# Patient Record
Sex: MALE | Race: WHITE
[De-identification: names, ages, dates, MRNs, and addresses within clinical notes are randomized per-mention and may not be internally consistent; named-entity substitution may affect disease eponyms.]

---

## 2019-03-21 ENCOUNTER — HOSPITAL ENCOUNTER (EMERGENCY)
Dept: HOSPITAL 53 - M ED | Age: 69
LOS: 1 days | Discharge: HOME | End: 2019-03-22
Payer: MEDICARE

## 2019-03-21 VITALS — BODY MASS INDEX: 35.15 KG/M2 | HEIGHT: 70 IN | WEIGHT: 245.51 LBS

## 2019-03-21 DIAGNOSIS — Z79.01: ICD-10-CM

## 2019-03-21 DIAGNOSIS — M25.561: ICD-10-CM

## 2019-03-21 DIAGNOSIS — G89.18: Primary | ICD-10-CM

## 2019-03-21 DIAGNOSIS — I10: ICD-10-CM

## 2019-03-21 DIAGNOSIS — Z79.899: ICD-10-CM

## 2019-03-22 VITALS — SYSTOLIC BLOOD PRESSURE: 124 MMHG | DIASTOLIC BLOOD PRESSURE: 57 MMHG

## 2019-03-22 LAB
ALBUMIN SERPL BCG-MCNC: 2.6 GM/DL (ref 3.2–5.2)
ALT SERPL W P-5'-P-CCNC: 21 U/L (ref 12–78)
AMYLASE SERPL-CCNC: 38 U/L (ref 25–115)
APTT BLD: 28.9 SECONDS (ref 25.4–37.6)
BASOPHILS # BLD AUTO: 0.1 10^3/UL (ref 0–0.2)
BASOPHILS NFR BLD AUTO: 0.7 % (ref 0–1)
BILIRUB CONJ SERPL-MCNC: 0.2 MG/DL (ref 0–0.2)
BILIRUB SERPL-MCNC: 0.6 MG/DL (ref 0.2–1)
BUN SERPL-MCNC: 17 MG/DL (ref 7–18)
CALCIUM SERPL-MCNC: 8 MG/DL (ref 8.8–10.2)
CHLORIDE SERPL-SCNC: 105 MEQ/L (ref 98–107)
CK MB CFR.DF SERPL CALC: 0.57
CK MB SERPL-MCNC: < 1 NG/ML (ref ?–3.6)
CK SERPL-CCNC: 174 U/L (ref 39–308)
CO2 SERPL-SCNC: 28 MEQ/L (ref 21–32)
CREAT SERPL-MCNC: 0.74 MG/DL (ref 0.7–1.3)
CRP SERPL-MCNC: 21.3 MG/DL (ref 0–0.3)
EOSINOPHIL # BLD AUTO: 0.1 10^3/UL (ref 0–0.5)
EOSINOPHIL NFR BLD AUTO: 1.7 % (ref 0–3)
GFR SERPL CREATININE-BSD FRML MDRD: > 60 ML/MIN/{1.73_M2} (ref 42–?)
GLUCOSE SERPL-MCNC: 119 MG/DL (ref 70–100)
HCT VFR BLD AUTO: 35.1 % (ref 42–52)
HGB BLD-MCNC: 11.4 G/DL (ref 13.5–17.5)
INR PPP: 1.22
LYMPHOCYTES # BLD AUTO: 1.3 10^3/UL (ref 1.5–4.5)
LYMPHOCYTES NFR BLD AUTO: 15.6 % (ref 24–44)
MCH RBC QN AUTO: 27.9 PG (ref 27–33)
MCHC RBC AUTO-ENTMCNC: 32.5 G/DL (ref 32–36.5)
MCV RBC AUTO: 85.8 FL (ref 80–96)
MONOCYTES # BLD AUTO: 0.9 10^3/UL (ref 0–0.8)
MONOCYTES NFR BLD AUTO: 10.8 % (ref 0–5)
NEUTROPHILS # BLD AUTO: 5.8 10^3/UL (ref 1.8–7.7)
NEUTROPHILS NFR BLD AUTO: 68.7 % (ref 36–66)
PLATELET # BLD AUTO: 279 10^3/UL (ref 150–450)
POTASSIUM SERPL-SCNC: 4.1 MEQ/L (ref 3.5–5.1)
PROT SERPL-MCNC: 5.7 GM/DL (ref 6.4–8.2)
PROTHROMBIN TIME: 15.6 SECONDS (ref 12.1–14.4)
RBC # BLD AUTO: 4.09 10^6/UL (ref 4.3–6.1)
SODIUM SERPL-SCNC: 139 MEQ/L (ref 136–145)
TROPONIN I SERPL-MCNC: < 0.02 NG/ML (ref ?–0.1)
WBC # BLD AUTO: 8.4 10^3/UL (ref 4–10)

## 2019-03-22 NOTE — ECGEPIP
Stationary ECG Study

                           [facility] - ED

                                       

                                       Test Date:    2019

Pat Name:     ASHLEY BROWN             Department:   

Patient ID:   [id number]                 Room:         -

Gender:       M                        Technician:   SONIA

:          1950               Requested By: DICK FINN 

Order Number: [id number]     Reading MD:   Nolan Shepard

                                 Measurements

Intervals                              Axis          

Rate:         91                       P:            7

AR:           171                      QRS:          -12

QRSD:         96                       T:            30

QT:           338                                    

QTc:          416                                    

                           Interpretive Statements

SINUS RHYTHM

NSTTW ABNORMALITIES

SIMILAR TO 8/6/15

 

Electronically Signed On 3- 6:43:50 EDT by Nolan Shepard

## 2019-03-31 ENCOUNTER — HOSPITAL ENCOUNTER (OUTPATIENT)
Dept: HOSPITAL 53 - M ED | Age: 69
Discharge: NURSING, REHAB OR LONG-TERM CARE | End: 2019-03-31
Attending: SPECIALIST
Payer: MEDICARE

## 2019-03-31 VITALS — WEIGHT: 247.14 LBS | HEIGHT: 70 IN | BODY MASS INDEX: 35.38 KG/M2

## 2019-03-31 VITALS — DIASTOLIC BLOOD PRESSURE: 66 MMHG | SYSTOLIC BLOOD PRESSURE: 123 MMHG

## 2019-03-31 VITALS — SYSTOLIC BLOOD PRESSURE: 129 MMHG | DIASTOLIC BLOOD PRESSURE: 70 MMHG

## 2019-03-31 VITALS — SYSTOLIC BLOOD PRESSURE: 131 MMHG | DIASTOLIC BLOOD PRESSURE: 68 MMHG

## 2019-03-31 VITALS — DIASTOLIC BLOOD PRESSURE: 68 MMHG | SYSTOLIC BLOOD PRESSURE: 124 MMHG

## 2019-03-31 VITALS — DIASTOLIC BLOOD PRESSURE: 68 MMHG | SYSTOLIC BLOOD PRESSURE: 131 MMHG

## 2019-03-31 DIAGNOSIS — Z79.01: ICD-10-CM

## 2019-03-31 DIAGNOSIS — R33.9: ICD-10-CM

## 2019-03-31 DIAGNOSIS — Z86.61: ICD-10-CM

## 2019-03-31 DIAGNOSIS — Z87.820: ICD-10-CM
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
## 2019-03-31 DIAGNOSIS — Z96.651: ICD-10-CM

## 2019-03-31 DIAGNOSIS — Z86.718: ICD-10-CM

## 2019-03-31 DIAGNOSIS — N36.5: Primary | ICD-10-CM
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
## 2019-03-31 DIAGNOSIS — N32.89: ICD-10-CM

## 2019-03-31 DIAGNOSIS — T83.83XA: ICD-10-CM
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
## 2019-03-31 DIAGNOSIS — Z86.711: ICD-10-CM

## 2019-03-31 DIAGNOSIS — R31.0: ICD-10-CM

## 2019-03-31 LAB
APTT BLD: 33.6 SECONDS (ref 25.4–37.6)
BUN SERPL-MCNC: 19 MG/DL (ref 7–18)
CALCIUM SERPL-MCNC: 8.2 MG/DL (ref 8.8–10.2)
CHLORIDE SERPL-SCNC: 101 MEQ/L (ref 98–107)
CO2 SERPL-SCNC: 30 MEQ/L (ref 21–32)
CREAT SERPL-MCNC: 0.96 MG/DL (ref 0.7–1.3)
GFR SERPL CREATININE-BSD FRML MDRD: > 60 ML/MIN/{1.73_M2} (ref 42–?)
GLUCOSE SERPL-MCNC: 122 MG/DL (ref 70–100)
HCT VFR BLD AUTO: 37.1 % (ref 42–52)
HGB BLD-MCNC: 12 G/DL (ref 13.5–17.5)
INR PPP: 1.48
MCH RBC QN AUTO: 27.9 PG (ref 27–33)
MCHC RBC AUTO-ENTMCNC: 32.3 G/DL (ref 32–36.5)
MCV RBC AUTO: 86.3 FL (ref 80–96)
PLATELET # BLD AUTO: 436 10^3/UL (ref 150–450)
POTASSIUM SERPL-SCNC: 3.9 MEQ/L (ref 3.5–5.1)
PROTHROMBIN TIME: 18.2 SECONDS (ref 12.1–14.4)
RBC # BLD AUTO: 4.3 10^6/UL (ref 4.3–6.1)
SODIUM SERPL-SCNC: 138 MEQ/L (ref 136–145)
WBC # BLD AUTO: 13.5 10^3/UL (ref 4–10)

## 2019-03-31 PROCEDURE — 86900 BLOOD TYPING SEROLOGIC ABO: CPT

## 2019-03-31 PROCEDURE — 85027 COMPLETE CBC AUTOMATED: CPT

## 2019-03-31 PROCEDURE — 86901 BLOOD TYPING SEROLOGIC RH(D): CPT

## 2019-03-31 PROCEDURE — 80048 BASIC METABOLIC PNL TOTAL CA: CPT

## 2019-03-31 PROCEDURE — 85730 THROMBOPLASTIN TIME PARTIAL: CPT

## 2019-03-31 PROCEDURE — 86850 RBC ANTIBODY SCREEN: CPT

## 2019-03-31 PROCEDURE — 85610 PROTHROMBIN TIME: CPT

## 2019-03-31 PROCEDURE — 99285 EMERGENCY DEPT VISIT HI MDM: CPT

## 2019-03-31 PROCEDURE — 52001 CYSTO W/IRRG&EVAC MLT CLOTS: CPT
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
## 2019-04-01 ENCOUNTER — HOSPITAL ENCOUNTER (OUTPATIENT)
Dept: HOSPITAL 53 - SKLAB4 | Age: 69
End: 2019-04-01
Attending: INTERNAL MEDICINE

## 2019-04-01 DIAGNOSIS — D64.9: Primary | ICD-10-CM

## 2019-04-01 LAB
HCT VFR BLD AUTO: 32.4 % (ref 42–52)
HGB BLD-MCNC: 10.2 G/DL (ref 13.5–17.5)
MCH RBC QN AUTO: 27.8 PG (ref 27–33)
MCHC RBC AUTO-ENTMCNC: 31.5 G/DL (ref 32–36.5)
MCV RBC AUTO: 88.3 FL (ref 80–96)
PLATELET # BLD AUTO: 381 10^3/UL (ref 150–450)
RBC # BLD AUTO: 3.67 10^6/UL (ref 4.3–6.1)
WBC # BLD AUTO: 9.6 10^3/UL (ref 4–10)

## 2019-04-02 NOTE — RO
DATE OF SURGICAL PROCEDURE:  03/31/2019

 

PREOPERATIVE DIAGNOSES:  Inability to place Hugo catheter, gross hematuria, and

urinary retention.

 

POSTOPERATIVE DIAGNOSES:  Multiple urethral false passages and an elevated

bladder neck.

 

PROCEDURE:  Cystoscopy, complex Hugo catheter placement, and clot irrigation.

 

SURGEON:  Myrtle Gasca MD

 

ANESTHESIA:  Monitored anesthesia care (MAC).

 

MEDICATIONS:  Ancef 2 grams preoperatively.

 

DRAINS:  Three-way 20-Armenian Hugo catheter.

 

INDICATIONS FOR PROCEDURE:  The patient is a 68-year-old gentleman who recently

had a right total knee replacement.  [facility]

to [facility].  A postvoid residual (PVR) was 900 mL, and a Hugo catheter was

placed.  Originally, this was draining well, but then it stopped draining, so it

was removed.  Nurses attempted for "several hours" to put in another catheter but

were unable to.  The patient was then sent to the emergency room where the nurses

there tried, and I tried also but also was unable to place a Hugo.  At this

point, it was decided to bring the patient up to the operating room for further

management.  All options, alternatives, risks, and benefits were discussed, and

informed consent was obtained in both verbal and written form.

 

DESCRIPTION OF PROCEDURE:  The patient was brought into the operating room.

Sequential compression devices were in place, and antibiotics were given.

Anesthesia was given with mild sedation, and we placed his right knee extremely

carefully in the lithotomy position while the patient was still partially awake

to tell us if there was any discomfort.  Once he was in the lithotomy position

and his pressure points were well padded and protected, he was prepped and draped

in a usual fashion.  Next, a 21-Armenian cystoscope was inserted.  The urethra was

opened until we got to the area where there were at least 4-5 false passages at

the level of the verumontanum.  Upon entering the verumontanum, the prostatic

urethra was only about 1-1/2 to 2 cm in length, but there was lateral hypertrophy

and an elevated bladder neck.  Upon entering the bladder, I emptied the bladder,

and there were a few clots which were then irrigated.  At this point, cystoscopy

showed both ureteral orifices with some trabeculation but no stones, erythematous

patches, or lesions.  At this point, a wire was placed in the bladder, and a

20-Armenian three-way Hugo catheter was cut at the tip and placed over the wire

without difficulty into the bladder.  20 mL were placed in the balloon.  It was

then draining clear yellow urine.  The patient tolerated the procedure well and

was returned to the recovery room in stable condition.

 

I recommend keeping the Hugo in for a minimum of 7 days to allow the urethral

false passages to heal and that the patient not have a voiding trial until he is

ambulating better and moving his bowels well.

## 2019-04-12 ENCOUNTER — HOSPITAL ENCOUNTER (OUTPATIENT)
Dept: HOSPITAL 53 - M WUC | Age: 69
End: 2019-04-12
Attending: NURSE PRACTITIONER
Payer: MEDICARE

## 2019-04-12 DIAGNOSIS — D64.9: Primary | ICD-10-CM

## 2019-04-12 LAB
HCT VFR BLD AUTO: 40.5 % (ref 42–52)
HGB BLD-MCNC: 12.3 G/DL (ref 13.5–17.5)
MCH RBC QN AUTO: 27.1 PG (ref 27–33)
MCHC RBC AUTO-ENTMCNC: 30.4 G/DL (ref 32–36.5)
MCV RBC AUTO: 89.2 FL (ref 80–96)
PLATELET # BLD AUTO: 322 10^3/UL (ref 150–450)
RBC # BLD AUTO: 4.54 10^6/UL (ref 4.3–6.1)
WBC # BLD AUTO: 9.3 10^3/UL (ref 4–10)

## 2022-08-13 ENCOUNTER — HOSPITAL ENCOUNTER (EMERGENCY)
Dept: HOSPITAL 53 - M ED | Age: 72
LOS: 1 days | Discharge: HOME | End: 2022-08-14
Payer: MEDICARE

## 2022-08-13 VITALS — HEIGHT: 70 IN | BODY MASS INDEX: 31.56 KG/M2 | WEIGHT: 220.46 LBS
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
## 2022-08-13 DIAGNOSIS — S81.011A: Primary | ICD-10-CM
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
## 2022-08-13 DIAGNOSIS — Z79.899: ICD-10-CM

## 2022-08-13 DIAGNOSIS — I10: ICD-10-CM

## 2022-08-13 PROCEDURE — 99284 EMERGENCY DEPT VISIT MOD MDM: CPT

## 2022-08-13 PROCEDURE — 96365 THER/PROPH/DIAG IV INF INIT: CPT

## 2022-08-13 PROCEDURE — 73564 X-RAY EXAM KNEE 4 OR MORE: CPT

## 2022-08-13 PROCEDURE — 12002 RPR S/N/AX/GEN/TRNK2.6-7.5CM: CPT

## 2022-08-13 PROCEDURE — 96366 THER/PROPH/DIAG IV INF ADDON: CPT

## 2022-08-14 VITALS — DIASTOLIC BLOOD PRESSURE: 82 MMHG | SYSTOLIC BLOOD PRESSURE: 156 MMHG
